# Patient Record
Sex: FEMALE | Race: WHITE | NOT HISPANIC OR LATINO | Employment: FULL TIME | URBAN - METROPOLITAN AREA
[De-identification: names, ages, dates, MRNs, and addresses within clinical notes are randomized per-mention and may not be internally consistent; named-entity substitution may affect disease eponyms.]

---
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
## 2023-01-16 DIAGNOSIS — Z01.10 ENCOUNTER FOR HEARING TEST: Primary | ICD-10-CM

## 2023-02-13 ENCOUNTER — PRE VISIT (OUTPATIENT)
Dept: OTOLARYNGOLOGY | Facility: CLINIC | Age: 18
End: 2023-02-13

## 2023-02-16 NOTE — TELEPHONE ENCOUNTER
FUTURE VISIT INFORMATION      FUTURE VISIT INFORMATION:    Date: 4/18/23    Time: 1:50pm    Location: [facility]  REFERRAL INFORMATION:    Referring provider:  Rina Pa MD    Referring providers clinic:  [facility]    Reason for visit/diagnosis  New per pt/ref for Ear pain, bilateral [H92.03], ref prov: Rina Pa MD in [facility], recs/ref in epic, confirmed [facility] location     RECORDS REQUESTED FROM:         Clinic name Comments Records Status Imaging Status   [facility]  12/5/22- note with Rina Pa MD  11/27/22- note with Roshni Lama PA-C    Epic      [facility]  3/7/22- note with Jeanne Sanchez PA- C  11/11/21- note from Tierra Doran MD  11/1/21- note with Luis Enrique Lawson MD [facility]

## 2023-03-22 ENCOUNTER — TELEPHONE (OUTPATIENT)
Dept: OTOLARYNGOLOGY | Facility: CLINIC | Age: 18
End: 2023-03-22
Payer: COMMERCIAL

## 2023-04-10 ENCOUNTER — APPOINTMENT (OUTPATIENT)
Dept: GENERAL RADIOLOGY | Facility: CLINIC | Age: 18
End: 2023-04-10
Attending: EMERGENCY MEDICINE
Payer: COMMERCIAL

## 2023-04-10 ENCOUNTER — HOSPITAL ENCOUNTER (EMERGENCY)
Facility: CLINIC | Age: 18
Discharge: HOME | End: 2023-04-10
Attending: EMERGENCY MEDICINE | Admitting: EMERGENCY MEDICINE
Payer: COMMERCIAL

## 2023-04-10 VITALS — HEART RATE: 78 BPM | SYSTOLIC BLOOD PRESSURE: 102 MMHG | OXYGEN SATURATION: 100 % | DIASTOLIC BLOOD PRESSURE: 65 MMHG

## 2023-04-10 DIAGNOSIS — H72.92 PERFORATION OF TYMPANIC MEMBRANE, LEFT: ICD-10-CM

## 2023-04-10 DIAGNOSIS — S73.101A SPRAIN OF RIGHT HIP, INITIAL ENCOUNTER: ICD-10-CM

## 2023-04-10 PROCEDURE — 99283 EMERGENCY DEPT VISIT LOW MDM: CPT

## 2023-04-10 PROCEDURE — 73502 X-RAY EXAM HIP UNI 2-3 VIEWS: CPT

## 2023-04-10 RX ORDER — OFLOXACIN 3 MG/ML
5 SOLUTION AURICULAR (OTIC) 2 TIMES DAILY
Qty: 4 ML | Refills: 0 | Status: SHIPPED | OUTPATIENT
Start: 2023-04-10 | End: 2023-04-17

## 2023-04-10 ASSESSMENT — ENCOUNTER SYMPTOMS
SHORTNESS OF BREATH: 0
FEVER: 0
NAUSEA: 0
CHILLS: 0
ABDOMINAL PAIN: 0
SORE THROAT: 0

## 2023-04-10 ASSESSMENT — ACTIVITIES OF DAILY LIVING (ADL): ADLS_ACUITY_SCORE: 35

## 2023-04-10 NOTE — ED PROVIDER NOTES
"  History     Chief Complaint:  Hip Pain       HPI   Kenisha Morrow is a 17 year old female who presents for evaluation of right hip pain.  The patient says 1 month ago she was doing some weight training and felt a pain in the posterior right hip.  This had improved but today as she was getting out of bed she felt a shifting sensation in the same spot.  At times the pain is felt in the thigh but never below the knee.  No numbness or weakness of the legs.  No bladder or bowel incontinence.  No saddle anesthesia.  No other joint pain.    The patient says she recently moved here from [place].  While there she was part way through work-up for Rossana-Danlos syndrome given that she has had chronic joint pains and multiple sprains in the past.  She was not able to complete that evaluation now.    The patient says that she is also chronic ear infections.  She was cleaning her ear with a Q-tip for 5 days ago.  She intermittently inserted the Q-tip too far and began to have bleeding from the left ear.  This has since resolved.  Her hearing is near normal but still sounds somewhat \"muffled\".  She denies any pain in the ear.  No fever or purulent discharge.    Review of External Notes: Outpatient note reviewed from November 27, 2022 and the patient was seen for bilateral nonsuppurative otitis media    ROS:  Review of Systems   Constitutional: Negative for chills and fever.   HENT: Positive for ear pain and hearing loss. Negative for ear discharge and sore throat.    Respiratory: Negative for shortness of breath.    Cardiovascular: Negative for chest pain.   Gastrointestinal: Negative for abdominal pain and nausea.   Musculoskeletal:        Right posterior hip pain   All other systems reviewed and are negative.      Allergies:  Morphine Sulfate     Medications:    ofloxacin (FLOXIN) 0.3 % otic solution  albuterol (PROAIR HFA/PROVENTIL HFA/VENTOLIN HFA) 108 (90 Base) MCG/ACT inhaler  ibuprofen (ADVIL/MOTRIN) 200 MG " tablet        Past Medical History:    Past Medical History:   Diagnosis Date     Cafe-au-lait spots      Chronic rhinitis      Eczema      Esophageal reflux 3/17/2006     Nocturnal enuresis 12/13/2011     Skull fracture (H) 8/12/2013     Trauma 8/12/2013       Past Surgical History:    Past Surgical History:   Procedure Laterality Date     PE TUBES       TONSILLECTOMY & ADENOIDECTOMY          Family History:    family history includes Deep Vein Thrombosis (DVT) in her father; Diabetes in an other family member; Fibromyalgia in her mother; Heart Disease in her father and paternal grandfather; Migraines in her mother; No Known Problems in her brother, brother, brother, brother, sister, sister, and sister.    Social History:   reports that she has never smoked. She has never used smokeless tobacco. She reports that she does not drink alcohol and does not use drugs.  PCP: Rina Pa     Physical Exam     Patient Vitals for the past 24 hrs:   BP Pulse SpO2   04/10/23 1007 -- -- 100 %   04/10/23 1006 102/65 78 98 %        Physical Exam  Constitutional:       General: She is not in acute distress.     Appearance: She is not diaphoretic.   HENT:      Head: Atraumatic.      Mouth/Throat:      Pharynx: No oropharyngeal exudate.   Eyes:      General: No scleral icterus.     Pupils: Pupils are equal, round, and reactive to light.   Cardiovascular:      Heart sounds: Normal heart sounds.   Pulmonary:      Effort: No respiratory distress.      Breath sounds: Normal breath sounds.   Abdominal:      General: Bowel sounds are normal.      Palpations: Abdomen is soft.      Tenderness: There is no abdominal tenderness.   Musculoskeletal:         General: No tenderness.   Skin:     General: Skin is warm.      Findings: No rash.           Emergency Department Course     Imaging:  XR Pelvis and Hip Right 1 View   Final Result   IMPRESSION: Both hips negative for fracture. Pelvis negative for   fracture.      DAISHA SWANSON  MD BETHANIE            SYSTEM ID:  [id number]         Report per radiology    Emergency Department Course & Assessments:         Independent Interpretation (X-rays, CTs, rhythm strip):  Pelvic x-ray independently reviewed.  No fracture.    Disposition:  The patient was discharged to home.     Impression & Plan      Medical Decision Making:  This patient is a 17-year-old who presents with right hip pain.  She had an injury a month ago and then felt that she may have reinjured the hip today.  She has pain over the sacroiliac joint with mild tenderness in this area.  There is full range of motion of the hip without erythema or effusion.  No numbness or weakness of the legs.  She is able to ambulate without difficulty.  She will be referred to orthopedics    Patient also appears to have a perforated left tympanic membrane that is beginning to heal.  It does not clearly appear infected.  She will be referred to ENT and prescribed ofloxacin.    The patient says she plans to live in this area going forward and requested her primary care physician and a referral was placed.        Diagnosis:    ICD-10-CM    1. Sprain of right hip, initial encounter  S73.101A Primary Care Referral      2. Perforation of tympanic membrane, left  H72.92 Primary Care Referral               4/10/2023   Nino Al MD McRoberts, Sean Edward, MD  04/10/23 1010

## 2023-04-10 NOTE — ED TRIAGE NOTES
Patient presents to the ED reporting 1 month of right hip pain. States that initially injured it while lifting weights. Additionally reports was using a q-tip on the left ear a few days ago and began having bleeding from the ear.

## 2023-04-18 ENCOUNTER — PRE VISIT (OUTPATIENT)
Dept: OTOLARYNGOLOGY | Facility: CLINIC | Age: 18
End: 2023-04-18

## 2023-07-10 ENCOUNTER — HOSPITAL ENCOUNTER (EMERGENCY)
Facility: CLINIC | Age: 18
Discharge: LEFT BEFORE DISCHARGE | End: 2023-07-10

## 2023-07-10 VITALS
TEMPERATURE: 98.1 F | RESPIRATION RATE: 18 BRPM | SYSTOLIC BLOOD PRESSURE: 116 MMHG | OXYGEN SATURATION: 98 % | DIASTOLIC BLOOD PRESSURE: 77 MMHG | HEART RATE: 110 BPM

## 2023-07-11 NOTE — ED TRIAGE NOTES
Patient states recently she has not felt well and been nauseated and fatigued. Patient states today she was having abdominal cramping and went to the bathroom and found that she was bleeding. Patient states this is not when her normal period would be.      Triage Assessment     Row Name 07/10/23 2010       Triage Assessment (Adult)    Airway WDL WDL       Respiratory WDL    Respiratory WDL WDL       Skin Circulation/Temperature WDL    Skin Circulation/Temperature WDL WDL       Cardiac WDL    Cardiac WDL WDL       Peripheral/Neurovascular WDL    Peripheral Neurovascular WDL WDL       Cognitive/Neuro/Behavioral WDL    Cognitive/Neuro/Behavioral WDL WDL

## 2023-10-16 ENCOUNTER — APPOINTMENT (OUTPATIENT)
Dept: CT IMAGING | Facility: CLINIC | Age: 18
End: 2023-10-16
Attending: EMERGENCY MEDICINE
Payer: OTHER MISCELLANEOUS

## 2023-10-16 ENCOUNTER — NURSE TRIAGE (OUTPATIENT)
Dept: NURSING | Facility: CLINIC | Age: 18
End: 2023-10-16

## 2023-10-16 ENCOUNTER — TELEPHONE (OUTPATIENT)
Dept: OPHTHALMOLOGY | Facility: CLINIC | Age: 18
End: 2023-10-16

## 2023-10-16 ENCOUNTER — HOSPITAL ENCOUNTER (EMERGENCY)
Facility: CLINIC | Age: 18
Discharge: HOME | End: 2023-10-16
Attending: EMERGENCY MEDICINE | Admitting: EMERGENCY MEDICINE
Payer: OTHER MISCELLANEOUS

## 2023-10-16 ENCOUNTER — OFFICE VISIT (OUTPATIENT)
Dept: URGENT CARE | Facility: URGENT CARE | Age: 18
End: 2023-10-16
Payer: OTHER MISCELLANEOUS

## 2023-10-16 VITALS
TEMPERATURE: 98.5 F | WEIGHT: 140 LBS | RESPIRATION RATE: 16 BRPM | HEART RATE: 65 BPM | BODY MASS INDEX: 22.5 KG/M2 | DIASTOLIC BLOOD PRESSURE: 73 MMHG | SYSTOLIC BLOOD PRESSURE: 119 MMHG | HEIGHT: 66 IN | OXYGEN SATURATION: 100 %

## 2023-10-16 VITALS
TEMPERATURE: 98.4 F | BODY MASS INDEX: 23.85 KG/M2 | HEART RATE: 84 BPM | SYSTOLIC BLOOD PRESSURE: 97 MMHG | WEIGHT: 150 LBS | OXYGEN SATURATION: 98 % | DIASTOLIC BLOOD PRESSURE: 63 MMHG

## 2023-10-16 DIAGNOSIS — Z87.820 HISTORY OF TRAUMATIC BRAIN INJURY: ICD-10-CM

## 2023-10-16 DIAGNOSIS — S05.91XA RIGHT EYE INJURY, INITIAL ENCOUNTER: Primary | ICD-10-CM

## 2023-10-16 DIAGNOSIS — S05.11XA ORBITAL CONTUSION, RIGHT, INITIAL ENCOUNTER: ICD-10-CM

## 2023-10-16 DIAGNOSIS — S06.0X0A CONCUSSION WITHOUT LOSS OF CONSCIOUSNESS, INITIAL ENCOUNTER: ICD-10-CM

## 2023-10-16 LAB — HCG SER QL IA.RAPID: NEGATIVE

## 2023-10-16 PROCEDURE — 84703 CHORIONIC GONADOTROPIN ASSAY: CPT

## 2023-10-16 PROCEDURE — 70481 CT ORBIT/EAR/FOSSA W/DYE: CPT

## 2023-10-16 PROCEDURE — 99214 OFFICE O/P EST MOD 30 MIN: CPT | Performed by: NURSE PRACTITIONER

## 2023-10-16 PROCEDURE — 250N000011 HC RX IP 250 OP 636: Performed by: EMERGENCY MEDICINE

## 2023-10-16 PROCEDURE — 96374 THER/PROPH/DIAG INJ IV PUSH: CPT | Mod: 59

## 2023-10-16 PROCEDURE — 99285 EMERGENCY DEPT VISIT HI MDM: CPT | Mod: 25

## 2023-10-16 RX ORDER — IOPAMIDOL 755 MG/ML
500 INJECTION, SOLUTION INTRAVASCULAR ONCE
Status: COMPLETED | OUTPATIENT
Start: 2023-10-16 | End: 2023-10-16

## 2023-10-16 RX ORDER — KETOROLAC TROMETHAMINE 15 MG/ML
15 INJECTION, SOLUTION INTRAMUSCULAR; INTRAVENOUS ONCE
Status: COMPLETED | OUTPATIENT
Start: 2023-10-16 | End: 2023-10-16

## 2023-10-16 RX ADMIN — IOPAMIDOL 67 ML: 755 INJECTION, SOLUTION INTRAVENOUS at 19:47

## 2023-10-16 RX ADMIN — KETOROLAC TROMETHAMINE 15 MG: 15 INJECTION INTRAMUSCULAR; INTRAVENOUS at 19:19

## 2023-10-16 ASSESSMENT — ACTIVITIES OF DAILY LIVING (ADL)
ADLS_ACUITY_SCORE: 35
ADLS_ACUITY_SCORE: 35

## 2023-10-16 NOTE — ED TRIAGE NOTES
Pt arrives ambulatory from urgent care for further workup for concussion. Pt hit head with pliers while at work on Friday. Went to urgent care today for feeling more tired and headache pain and right eye pain. Eye stain negative and told she had a concussion. Was discharged then called by eye doctor and told to come to ED for MRI and eye doctor.

## 2023-10-16 NOTE — TELEPHONE ENCOUNTER
"Nurse Triage SBAR    Is this a 2nd Level Triage? yes    Situation: Urgent referral in the system.  Eye injury right eye    Background:     blunt trauma with metal rubber pliers 3 days ago to right eyebrow, ha, pain pressure with eye movements, wood's lamp negative for corneal abrasion, urgent care limited without slit lamp/pressure testing  Tesha Crisostomo, NP [facility] [place]     Assessment:   Patient pain with movement to right eye  Blood shot looking eye   Pain 6/10 if movement, comes and goes,  relaxing pain is a 4/10 to right eye.  Mild concussion per pt report at [facility].  Happened Friday at work  + floaters  It would be blurry to see the clock on the wall and book.  Using her phone the light bothers so she is trying to stay off it.  Plus movement makes her eye hurt more.  She was wearing contacts at the time of injury.  She is also a glasses wearere  Patient willing to to to [facility] for visit.    Protocol Recommended Disposition:   Routing to EYE clinic for call back with an appt.    [facility]    Recommendation:   If pain increases or vision gets worse, go to ED      Routed to provider          Reason for Disposition   Sharp object hit the eye (such as metallic chip)    Additional Information   Negative: Major bleeding that can't be stopped   Negative: Sounds like a life-threatening emergency to the triager    Answer Assessment - Initial Assessment Questions  1. MECHANISM: \"How did the injury happen?\"       Saudtter pin let loose and metal pliers to right eye  2. WHEN: \"When did the injury happen?\" (Minutes or hours ago)       Work injury, happened on friday  3. LOCATION: \"What part of the eye is injured?\" (cornea, sclera, eyelid, or periorbital tissue)      blunt trauma with metal rubber pliers 3 days ago to right eyebrow, ha, pain pressure with eye movements, wood's lamp negative for corneal abrasion, urgent care limited without slit lamp/pressure testing    4. EYE " "APPEARANCE: \"What does the eye look like?\"       White of eye is blood shot.  Swelling around face, eyelid puffy  5. VISION: \"Is the vision blurred?\"       Slightly blurry to see a clock, and book as well.  Trying to stay off her cell phone.  Light sensitive.  Contacts and glasses  6. SIZE: For cuts, bruises, or lumps, ask: \"How large is it?\" (Inches or centimeters)         7. PAIN: \"Is it painful?\" If so, ask: \"How bad is the pain?\"       Pain 7/10 with driving due to eye movements,  and 4/10 just relaxing.Hurts behind eye, like a migraines.   Pt has frequent migraines  8. TETANUS: For any breaks in the skin, ask: \"When was the last tetanus booster?\"      About 2 years ago.  With PCP,  different network.  [facility].    Protocols used: Eye Injury-P-[place]    "

## 2023-10-16 NOTE — TELEPHONE ENCOUNTER
You  Marylou Raines, JONATHAN; [facility]; [facility] Eye ServicesJust now (4:44 PM)     CF  Dr. Garcia and Dr. Padilla reviewed and advised pt should go to the ED now for likely needed imaging.  I called and spoke with this patient and she was agreeable.  She may go to the [facility] ED in [place], or she may come to the [facility] ED, she was unsure.    FEDERICO Alfaro  Eye Clinic Triage      You  Marylou Raines RN; [facility]; Soraida Padilla MD; Gabe Garcia MD; [facility] [id number] minutes ago (4:32 PM)     CF  Dr. Garcia - do you think this pt needs to be seen tonight?    Marylou Reza, JONATHAN  [facility]; [facility] [id number] minutes ago (4:11 PM)     DC  Wk injury,  already seen [facility],   , urgent referral in.  Metal plier to right eye.  Please call pt with an appt.  She is willing to go to [facility] or [facility]  Please respond ALL until pt notified of date and time of appt.   Thanks     Marylou Raines RN33 minutes ago (4:11 PM)     DC  Nurse Triage SBAR     Is this a 2nd Level Triage? yes     Situation: Urgent referral in the system.  Eye injury right eye     Background:      blunt trauma with metal rubber pliers 3 days ago to right eyebrow, ha, pain pressure with eye movements, wood's lamp negative for corneal abrasion, urgent care limited without slit lamp/pressure testing  Tesha Crisostomo M, NP [facility] [place]      Assessment:   Patient pain with movement to right eye  Blood shot looking eye   Pain 6/10 if movement, comes and goes,  relaxing pain is a 4/10 to right eye.  Mild concussion per pt report at well.  Happened Friday at work  + floaters  It would be blurry to see the clock on the wall and book.  Using her phone the light bothers so she is trying to stay off it.  Plus movement makes her eye hurt more.  She was wearing contacts at the time of injury.  She is also a glasses wearere  Patient  "willing to to to [facility] for visit.     Protocol Recommended Disposition:   Routing to EYE clinic for call back with an appt.    [facility]     Recommendation:   If pain increases or vision gets worse, go to ED        Routed to provider              Reason for Disposition   Sharp object hit the eye (such as metallic chip)    Additional Information   Negative: Major bleeding that can't be stopped   Negative: Sounds like a life-threatening emergency to the triager    Answer Assessment - Initial Assessment Questions  1. MECHANISM: \"How did the injury happen?\"       Kotter pin let loose and metal pliers to right eye  2. WHEN: \"When did the injury happen?\" (Minutes or hours ago)       Work injury, happened on friday  3. LOCATION: \"What part of the eye is injured?\" (cornea, sclera, eyelid, or periorbital tissue)      blunt trauma with metal rubber pliers 3 days ago to right eyebrow, ha, pain pressure with eye movements, wood's lamp negative for corneal abrasion, urgent care limited without slit lamp/pressure testing    4. EYE APPEARANCE: \"What does the eye look like?\"       White of eye is blood shot.  Swelling around face, eyelid puffy  5. VISION: \"Is the vision blurred?\"       Slightly blurry to see a clock, and book as well.  Trying to stay off her cell phone.  Light sensitive.  Contacts and glasses  6. SIZE: For cuts, bruises, or lumps, ask: \"How large is it?\" (Inches or centimeters)         7. PAIN: \"Is it painful?\" If so, ask: \"How bad is the pain?\"       Pain 7/10 with driving due to eye movements,  and 4/10 just relaxing.Hurts behind eye, like a migraines.   Pt has frequent migraines  8. TETANUS: For any breaks in the skin, ask: \"When was the last tetanus booster?\"      About 2 years ago.  With PCP,  different network.  [facility].    Protocols used: Eye Injury-P-OH           Note     Kenisha Acevedo [phone]  Marylou Raines, RN47 minutes ago (3:56 PM)     "

## 2023-10-16 NOTE — PROGRESS NOTES
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 injury.  She sustained blunt trauma with a metal wrapper pliers hitting her right eyebrow region 3 days ago while working on a car.  She immediately had headache pain around the eye pressure pain with eye movements photophobia.  There was a brief second of black, but she did not lose consciousness and declines vomiting sustained vision change ciliary flush ripping tearing sensation black peripheral vision flashing lights floaters bruising bleeding confusion trouble walking talking.  She does have a relevant history of TBI with skull fracture needing hospitalization and subsequent concussions following that.  Did not feel she needed to go to the ER for this and declines advanced imaging such as CT scan through [facility] today.  She wears contact lenses but does not regularly see her eye doctor.    Past Medical History:   Diagnosis Date    Cafe-au-lait spots     Chronic rhinitis     Eczema     Esophageal reflux 3/17/2006    Nocturnal enuresis 12/13/2011    Skull fracture (H) 8/12/2013    Trauma 8/12/2013     No current facility-administered medications on file prior to visit.  albuterol (PROAIR HFA/PROVENTIL HFA/VENTOLIN HFA) 108 (90 Base) MCG/ACT inhaler, Inhale 1-2 puffs into the lungs every 4 hours as needed for shortness of breath / dyspnea or wheezing  ibuprofen (ADVIL/MOTRIN) 200 MG tablet, Take 200 mg by mouth every 4 hours as needed for mild pain      Social History     Tobacco Use    Smoking status: Never    Smokeless tobacco: Never   Substance Use Topics    Alcohol use: No     Allergies   Allergen Reactions    Morphine Sulfate Nausea     Anything related      Review of Systems  All systems negative except for those listed above in HPI.    OBJECTIVE:   BP 97/63   Pulse 84   Temp 98.4  F (36.9  C) (Oral)   Wt 68 kg (150 lb)   LMP 09/25/2023 (Exact Date)   SpO2 98%   BMI 23.85 kg/m      Physical Exam  Constitutional:       General: She is not in acute distress.     Appearance: She is well-developed. She is not diaphoretic.   HENT:      Head: Normocephalic and atraumatic.      Right Ear: External ear normal.      Left Ear: External ear normal.      Nose: Nose normal.   Eyes:      General:         Right eye: No discharge.         Left eye: No discharge.      Extraocular Movements: Extraocular movements intact.      Conjunctiva/sclera: Conjunctivae normal.      Pupils: Pupils are equal, round, and reactive to light.      Comments: Right upper eyebrow tenderness, no crepitus bony step-off bruising.   Musculoskeletal:         General: No tenderness. Normal range of motion.      Cervical back: Normal range of motion and neck supple. No rigidity or tenderness.   Lymphadenopathy:      Cervical: No cervical adenopathy.   Skin:     General: Skin is warm and dry.      Findings: No bruising, erythema or rash.   Neurological:      General: No focal deficit present.      Mental Status: She is alert and oriented to person, place, and time.      Cranial Nerves: No cranial nerve deficit.      Sensory: No sensory deficit.      Motor: No weakness.      Gait: Gait normal.   Psychiatric:         Mood and Affect: Mood normal.         Behavior: Behavior normal.       ASSESSMENT:    ICD-10-CM    1. Right eye injury, initial encounter  S05.91XA Adult Neurology  Referral     Adult Eye  Referral      2. Concussion without loss of consciousness, initial encounter  S06.0X0A       3. History of traumatic brain injury  Z87.820         PLAN:     Procedure: 1 drop of tetracaine instilled for good anesthesia of the right eye.  Fluorescein stain strip used for uptake with Woods  lamp.  No corneal abrasion lesion foreign body visualized on exam.  Patient tolerated well.    Concussion symptoms on exam without neuro red flags per German head ct score  Eye exam is unremarkable, would still want follow-up if lingering pressure pain  Urgent care without slit lamp pressure testing  Concussion  given history  Urgent care without CT scan in clinic - pt would like to hold on outpatient imaging at this time through [facility]  Cool dark room, low stimuli, less electronics  Tylenol, ice, rest  ER immediately if worst headache of life, vision loss, confusion, vomiting, trouble walking, talking, bruising under eyes or nape of neck    Follow up with primary care provider with any problems, questions or concerns or if symptoms worsen or fail to improve. Patient agreed to plan and verbalized understanding.    Tesha Levine, [facility]

## 2023-10-16 NOTE — PATIENT INSTRUCTIONS
Concussion symptoms on exam without neuro red flags per [place] head ct score  Eye exam is unremarkable, would still want follow-up if lingering pressure pain  Urgent care without slit lamp pressure testing  Concussion  given history  Urgent care without CT scan in clinic - pt would like to hold on outpatient imaging at this time through [facility]  Cool dark room, low stimuli, less electronics  Tylenol, ice, rest  ER immediately if worst headache of life, vision loss, confusion, vomiting, trouble walking, talking, bruising under eyes or nape of neck

## 2023-10-17 NOTE — ED PROVIDER NOTES
"  History     Chief Complaint:  Head Injury       The history is provided by the patient.      Kenisha Morrow is a 18 year old female with history of skull fracture who presents to the ED with head injury and eye injury. Patient reports on Friday she was hit herself in the head with a pair of pliers. She reports she hither right eye. Right eye pain that exacerbates when moving it. Patient was disoriented but did not lose consciousness. Patient went to urgent care and believed it was a concussion. She was also referred to an ophthalmologist for corneal abrasions. Ophthalmologist performed a questionnaire and referred patient to ED. Additional symptoms include fatigue, nausea, and headache.  .    Independent Historian:   None - Patient Only    Review of External Notes:         Medications:    Albuterol inhaler   Ibuprofen   Imitrex   Zofran    Past Medical History:    Chronic rhinitis   Eczema  Gastroesophageal reflux disease  Nocturnal enuresis  Skull fracture  Trauma   Overweight  Migraine     Past Surgical History:    PE tubes  Tonsillectomy and adenoidectomy   Tympanostomy       Physical Exam   Patient Vitals for the past 24 hrs:   BP Temp Temp src Pulse Resp SpO2 Height Weight   10/16/23 1733 111/75 98.5  F (36.9  C) Oral 103 18 100 % 1.676 m (5' 6\") 63.5 kg (140 lb)        Physical Exam  Vitals reviewed.   HENT:      Head: Normocephalic.      Right Ear: Tympanic membrane normal.      Left Ear: Tympanic membrane normal.      Nose: Nose normal.   Eyes:      Comments: There is slight swelling around the superior aspect of the right orbit.  There is tenderness with palpation patient states it hurts when she looks up and to the right.  There is no proptosis.   Cardiovascular:      Rate and Rhythm: Normal rate.   Pulmonary:      Effort: Pulmonary effort is normal.   Musculoskeletal:         General: Normal range of motion.   Skin:     General: Skin is warm.      Capillary Refill: Capillary refill takes less " than 2 seconds.   Neurological:      General: No focal deficit present.      Mental Status: She is alert and oriented to person, place, and time.   Psychiatric:         Mood and Affect: Mood normal.           Emergency Department Course   Imaging:  CT Orbits w Contrast   Final Result   IMPRESSION:    1.  Normal orbit CT.         Laboratory:  Labs Ordered and Resulted from Time of ED Arrival to Time of ED Departure   ISTAT HCG QUALITATIVE PREGNANCY POCT - Normal       Result Value    HCG Qualitative POCT Negative        Emergency Department Course & Assessments:    Interventions:  Medications   ketorolac (TORADOL) injection 15 mg (15 mg Intravenous $Given 10/16/23 1919)   iopamidol (ISOVUE-370) solution 500 mL (67 mLs Intravenous $Given 10/16/23 1947)   sodium chloride (PF) 0.9% PF flush 100 mL (60 mLs Intravenous $Given 10/16/23 1947)        Assessments:  1833 I obtained the history and examined the patient as noted above  2154 I rechecked and updated the patient.    Independent Interpretation (X-rays, CTs, rhythm strip):      Consultations/Discussion of Management or Tests:  None        Social Determinants of Health affecting care:   None    Disposition:  The patient was discharged to home.     Impression & Plan    Medical Decision Making:    Patient presents with blunt right orbital injury.  Apparently called an ophthalmologist in over the phone was recommended MRI for what I am clinically not sure her examination is not really concerning for significant injury to the eyeball there is no conjunctival injection there is no scleral hematoma.  Her pupil is round and reactive and there is no visual concerns she continues to have pain when she moves her right eye therefore CT of the orbit with contrast was performed to rule out retrobulbar hematoma and was negative.  There is no emergency that identified related to ocular injury.  Highly encourage follow-up for retinal exam through ophthalmology no need for admission  and no need for MRI.  Patient was discharged home in stable condition.    Diagnosis:    ICD-10-CM    1. Orbital contusion, right, initial encounter  S05.11XA            Discharge Medications:  Discharge Medication List as of 10/16/2023  9:57 PM        Scribe Disclosure:  I, Mikael Munoz, am serving as a scribe at 9:55 PM on 10/16/2023 to document services personally performed by Frank Feng MD based on my observations and the provider's statements to me.     10/16/2023   Frank Feng MD Goodman, Brian Samuel, MD  10/21/23 4968

## 2023-10-17 NOTE — DISCHARGE INSTRUCTIONS
We have done extensive imaging of the right orbit and find no signs of fracture of the bones nor bleeding behind the eyeball.  We are unsure what your ophthalmologist wanted us to do as far as MRI but these are the standard test for trauma and are negative.  Please follow-up with them if your eye continues to bother you okay to use ice to the right forehead bruising and swelling should go away with time.  Thanks for your patience today.

## 2024-02-03 ENCOUNTER — HEALTH MAINTENANCE LETTER (OUTPATIENT)
Age: 19
End: 2024-02-03

## 2024-06-02 ENCOUNTER — PATIENT OUTREACH (OUTPATIENT)
Dept: CARE COORDINATION | Facility: CLINIC | Age: 19
End: 2024-06-02

## 2025-03-02 ENCOUNTER — HEALTH MAINTENANCE LETTER (OUTPATIENT)
Age: 20
End: 2025-03-02

## 2025-04-03 ENCOUNTER — NURSE TRIAGE (OUTPATIENT)
Dept: FAMILY MEDICINE | Facility: CLINIC | Age: 20
End: 2025-04-03

## 2025-04-03 NOTE — TELEPHONE ENCOUNTER
Nurse Triage SBAR    Is this a 2nd Level Triage? NO    Situation: abnormal bleeding between menstrual cycles    Background: Pt has history of spotting between menstrual cycles. Her periods are typically regular occurring every 28 to 31 days and lasting 5-7 days at at time. Last menstrual cycle started March 13th and ended March 19th. Current bleeding started March 29th and was mostly spotting until today when it became a more moderate flow.    Assessment:  Pt typically has spotting between periods which is brown red and does not soak a pantyliner. She started spotting March 29th and then today it's a bright red medium flow starting 2-3 hours ago but has not yet soaked pad. Some clots are present which are no bigger than a dime. Menstrual like cramp occurring infrequently rated at 4 out of 10 on pain scale. Pt has had unprotected sex and is not on BC pills and is unsure of pregnancy status. She denies lightheadedness, vaginal discharge, fever, and passed tissue.    Protocol Recommended Disposition:   See in Office Within 2 Weeks    Recommendation: Recommended pt take pregnancy test. Reviewed care advice under care tab with pt. Instructed pt to call back if new or worsening symptoms. Patient was given an opportunity to ask questions, verbalized understanding of plan, and is agreeable.    Scheduled pt for tomorrow with YANELI Bowser RN        Does the patient meet one of the following criteria for [facility] visit consideration? 16+ years old, with an [facility] PCP     TIP  Providers, please consider if this condition is appropriate for management at one of our Acute and Diagnostic Services sites.     If patient is a good candidate, please use dotphrase <dot>triageresponse and select Refer to [facility] to document.    Reason for Disposition   Bleeding or spotting between regular periods occurs more than three cycles (3 months) this past year    Additional Information   Negative: SEVERE vaginal bleeding (e.g.,  continuous red blood from vagina, or large blood clots) and very weak (can't stand)   Negative: Passed out (e.g., fainted, lost consciousness, blacked out and was not responding)   Negative: Difficult to awaken or acting confused (e.g., disoriented, slurred speech)   Negative: Shock suspected (e.g., cold/pale/clammy skin, too weak to stand, low BP, rapid pulse)   Negative: Sounds like a life-threatening emergency to the triager   Negative: Pregnant 20 or more weeks (5 months or more)   Negative: Pregnant < 20 weeks (less than 5 months)   Negative: Postpartum (from 0 to 6 weeks after delivery)   Negative: Vaginal discharge is main symptom and bleeding is slight   Negative: SEVERE abdominal pain (e.g., excruciating)   Negative: SEVERE dizziness (e.g., unable to stand, requires support to walk, feels like passing out now)   Negative: SEVERE vaginal bleeding (e.g., soaking 2 pads or tampons per hour and present 2 or more hours; 1 menstrual cup every 2 hours)   Negative: Patient sounds very sick or weak to the triager   Negative: MODERATE vaginal bleeding (i.e., soaking pad or tampon per hour and present > 6 hours; 1 menstrual cup every 6 hours)   Negative: Constant abdominal pain lasting > 2 hours   Negative: Pale skin (pallor) of new-onset or getting worse   Negative: Taking Coumadin (warfarin) or other strong blood thinner, or known bleeding disorder (e.g., thrombocytopenia)   Negative: Skin bruises or nosebleed and not caused by an injury   Negative: Patient wants to be seen   Negative: Passed tissue (e.g., gray-white)   Negative: Bleeding or spotting after procedure (e.g., biopsy) or pelvic examination (e.g., pap smear) that lasts > 7 days   Negative: Periods with > 6 soaked pads or tampons per day   Negative: Periods last > 7 days   Negative: Uses menstrual cups and more than 80 ml blood per menstrual period   Negative: Missed period has occurred 2 or more times in the last year and the cause is not known    "Negative: Menstrual cycle < 21 days OR > 35 days, and occurs more than two cycles (2 months) this past year    Answer Assessment - Initial Assessment Questions  1. BLEEDING SEVERITY: \"Describe the bleeding that you are having.\" \"How much bleeding is there?\"       Sometimes just a little spotting brown red (pantyliner), today it's heavy like medium flow bright red, today flow got heavier 2-3 hours ago and have not soaked through pad yet  2. ONSET: \"When did the bleeding begin?\" \"Is it continuing now?\"      March 29th  3. MENSTRUAL PERIOD: \"When was the last normal menstrual period?\" \"How is this different than your period?\"      March 13th, ended March 19th  4. REGULARITY: \"How regular are your periods?\"      Usually in 28 to 31 days, lasting 5-7 days  5. ABDOMEN PAIN: \"Do you have any pain?\" \"How bad is the pain?\"  (e.g., Scale 0-10; none, mild, moderate, or severe)      Every once in awhile feels like menstrual cramp, rated at 4 out of 10  6. PREGNANCY: \"Is there any chance you are pregnant?\" \"When was your last menstrual period?\"      Maybe, I doubt it, I have had intercourse without protection  7. BREASTFEEDING: \"Are you breastfeeding?\"      no  8. HORMONE MEDICINES: \"Are you taking any hormone medicines, prescription or over-the-counter?\" (e.g., birth control pills, estrogen)      no  9. BLOOD THINNER MEDICINES: \"Do you take any blood thinners?\" (e.g., Coumadin / warfarin, Pradaxa / dabigatran, aspirin)      no  10. CAUSE: \"What do you think is causing the bleeding?\" (e.g., recent gyn surgery, recent gyn procedure; known bleeding disorder, cervical cancer, polycystic ovarian disease, fibroids)          unsure  11. HEMODYNAMIC STATUS: \"Are you weak or feeling lightheaded?\" If Yes, ask: \"Can you stand and walk normally?\"         no  12. OTHER SYMPTOMS: \"What other symptoms are you having with the bleeding?\" (e.g., passed tissue, vaginal discharge, fever, menstrual-type cramps)       Some clots- no bigger than a " dime, Every once in awhile feels like menstrual cramp, rated at 4 out of 10    Denies lightheadedness, vaginal discharge, fever, passed tissue    Protocols used: Vaginal Bleeding - [id number]

## 2025-04-08 ENCOUNTER — OFFICE VISIT (OUTPATIENT)
Dept: URGENT CARE | Facility: URGENT CARE | Age: 20
End: 2025-04-08
Payer: COMMERCIAL

## 2025-04-08 VITALS
BODY MASS INDEX: 27.32 KG/M2 | HEIGHT: 66 IN | DIASTOLIC BLOOD PRESSURE: 69 MMHG | OXYGEN SATURATION: 100 % | RESPIRATION RATE: 16 BRPM | SYSTOLIC BLOOD PRESSURE: 103 MMHG | HEART RATE: 90 BPM | WEIGHT: 170 LBS | TEMPERATURE: 98.3 F

## 2025-04-08 DIAGNOSIS — J02.9 ACUTE SORE THROAT: Primary | ICD-10-CM

## 2025-04-08 LAB — DEPRECATED S PYO AG THROAT QL EIA: NEGATIVE

## 2025-04-08 PROCEDURE — 87651 STREP A DNA AMP PROBE: CPT | Performed by: PHYSICIAN ASSISTANT

## 2025-04-08 PROCEDURE — 3074F SYST BP LT 130 MM HG: CPT | Performed by: PHYSICIAN ASSISTANT

## 2025-04-08 PROCEDURE — 3078F DIAST BP <80 MM HG: CPT | Performed by: PHYSICIAN ASSISTANT

## 2025-04-08 PROCEDURE — 99213 OFFICE O/P EST LOW 20 MIN: CPT | Performed by: PHYSICIAN ASSISTANT

## 2025-04-08 NOTE — PROGRESS NOTES
Order sent over to [facility] Atten: Linette   SUBJECTIVE:  Kenisha Morrow is a 19 year old female comes in with concerns for possible strep throat.  Patient states that she has had a sore throat for approximately 2 days.  Also has had mild fatigue and headache.  There is been no fevers.  Denies any cough or cold symptoms.  She is concerned for possible strep.  She is unsure of any exposures.  She is able to eat and drink well.  She is otherwise at baseline health.    Past Medical History:   Diagnosis Date    Cafe-au-lait spots     Chronic rhinitis     Eczema     Esophageal reflux 3/17/2006    Nocturnal enuresis 12/13/2011    Skull fracture (H) 8/12/2013    Trauma 8/12/2013     Patient Active Problem List   Diagnosis    Allergic rhinitis    Chronic rhinitis    Eczema    Premature adrenarche    PCOS (polycystic ovarian syndrome)    Overweight (BMI 25.0-29.9)    Migraine syndrome     Current Outpatient Medications   Medication Sig Dispense Refill    albuterol (PROAIR HFA/PROVENTIL HFA/VENTOLIN HFA) 108 (90 Base) MCG/ACT inhaler Inhale 1-2 puffs into the lungs every 4 hours as needed for shortness of breath / dyspnea or wheezing 18 g 1    ibuprofen (ADVIL/MOTRIN) 200 MG tablet Take 200 mg by mouth every 4 hours as needed for mild pain       No current facility-administered medications for this visit.     Social History     Socioeconomic History    Marital status: Single     Spouse name: Not on file    Number of children: Not on file    Years of education: Not on file    Highest education level: Not on file   Occupational History    Occupation: STUDENT   Tobacco Use    Smoking status: Never    Smokeless tobacco: Never   Vaping Use    Vaping status: Never Used   Substance and Sexual Activity    Alcohol use: No    Drug use: No    Sexual activity: Yes     Partners: Male     Birth control/protection: Condom   Other Topics Concern    Not on file   Social History Narrative    Not on file     Social Drivers of Health     Financial Resource Strain: High Risk  (1/1/2022)    Received from [facility], [facility]    Financial Resource Strain     Difficulty of Paying Living Expenses: Not on file     Difficulty of Paying Living Expenses: Not on file   Food Insecurity: Not on file   Transportation Needs: Not on file   Physical Activity: Not on file   Stress: Not on file   Social Connections: Unknown (1/1/2022)    [facility], [facility]    Social Connections     Frequency of Communication with Friends and Family: Not on file   Interpersonal Safety: Not on file   Housing Stability: Not on file     ROS  negative  other than stated above    Exam:  GENERAL APPEARANCE: healthy, alert and no distress  EYES: EOMI,  PERRL  HENT: TMs and canals clear bilaterally.  Oral mucosa moist with no significant erythema noted.  There is no exudate you have is midline and no evidence for abscess.  Significant nasal congestion noted  NECK: no adenopathy, no asymmetry, masses, or scars and thyroid normal to palpation  RESP: lungs clear to auscultation - no rales, rhonchi or wheezes  CV: regular rates and rhythm, normal S1 S2, no S3 or S4 and no murmur, click or rub -  SKIN: no suspicious lesions or rashes    Results for orders placed or performed in visit on 04/08/25   Streptococcus A Rapid Screen w/Reflex to PCR - Clinic Collect     Status: Normal    Specimen: Throat; Swab   Result Value Ref Range    Group A Strep antigen Negative Negative     assessment/plan:  (J02.9) Acute sore throat  (primary encounter diagnosis)  Comment:   Plan: Streptococcus A Rapid Screen w/Reflex to PCR -         Clinic Collect, Group A Streptococcus PCR         Throat Swab        Patient with a 2-day history of sore throat along with mild headache.  Strep was negative.  Exam is unremarkable and appears to be viral.  Culture is pending.  Advised over-the-counter meds for  symptomatic relief.  Red flag signs were discussed return to clinic if symptoms worsen or new symptoms develop.

## 2025-04-08 NOTE — PROGRESS NOTES
Urgent Care Clinic Visit    Chief Complaint   Patient presents with    Urgent Care     Sore throat x 2 days , fatigue and headache                4/8/2025     4:55 PM   Additional Questions   Roomed by Gabi ISRAEL

## 2025-04-09 LAB — S PYO DNA THROAT QL NAA+PROBE: NOT DETECTED

## 2025-06-03 ENCOUNTER — OFFICE VISIT (OUTPATIENT)
Dept: OBGYN | Facility: CLINIC | Age: 20
End: 2025-06-03
Attending: PHYSICIAN ASSISTANT
Payer: COMMERCIAL

## 2025-06-03 VITALS
HEIGHT: 66 IN | WEIGHT: 168 LBS | DIASTOLIC BLOOD PRESSURE: 58 MMHG | BODY MASS INDEX: 27 KG/M2 | SYSTOLIC BLOOD PRESSURE: 100 MMHG

## 2025-06-03 DIAGNOSIS — N92.6 ABNORMAL BLEEDING IN MENSTRUAL CYCLE: ICD-10-CM

## 2025-06-03 DIAGNOSIS — E28.2 PCOS (POLYCYSTIC OVARIAN SYNDROME): ICD-10-CM

## 2025-06-03 DIAGNOSIS — L70.0 ACNE VULGARIS: Primary | ICD-10-CM

## 2025-06-03 PROCEDURE — 3078F DIAST BP <80 MM HG: CPT | Performed by: OBSTETRICS & GYNECOLOGY

## 2025-06-03 PROCEDURE — 3074F SYST BP LT 130 MM HG: CPT | Performed by: OBSTETRICS & GYNECOLOGY

## 2025-06-03 PROCEDURE — G2211 COMPLEX E/M VISIT ADD ON: HCPCS | Performed by: OBSTETRICS & GYNECOLOGY

## 2025-06-03 PROCEDURE — 99203 OFFICE O/P NEW LOW 30 MIN: CPT | Performed by: OBSTETRICS & GYNECOLOGY

## 2025-06-03 RX ORDER — DROSPIRENONE AND ETHINYL ESTRADIOL 0.02-3(28)
1 KIT ORAL DAILY
Qty: 84 TABLET | Refills: 4 | Status: SHIPPED | OUTPATIENT
Start: 2025-06-03

## 2025-06-03 NOTE — PROGRESS NOTES
"  SUBJECTIVE:                                                   CC:  Patient presents with:  Consult: PCOS      HPI:  Kenisha Morrow is a 19 year old      History of Present Illness-  Kenisha Morrow  - Diagnosed with PCOS, experiencing confidence issues  - Significant weight fluctuations, ranging from 128 lbs to 170 lbs  - Facial and neck hair growth, hair loss  - Tried 3 or 4 different birth controls without improvement in symptoms  - Ultrasound performed, no cysts detected  - Irregular periods historically, currently experiencing spotting between periods  - Spotting lasted 6 weeks with period in the middle  - Prolactin level mildly elevated, not breastfeeding  - Testosterone tested multiple times, within normal range but at the higher end     Gyn History:  Patient's last menstrual period was 05/15/2025 (exact date).       Using none for contraception.    Last 3 Pap and HPV Results:       2025     2:21 PM   PAP / HPV   PAP Negative for Intraepithelial Lesion or Malignancy (NILM)        PMH, PSH, Soc Hx, Fam Hx, Meds, and allergies reviewed in Epic.    OBJECTIVE:     /58 (BP Location: Right arm, Patient Position: Chair, Cuff Size: Adult Regular)   Ht 1.676 m (5' 6\")   Wt 76.2 kg (168 lb)   LMP 05/15/2025 (Exact Date)   BMI 27.12 kg/m      Gen: Healthy appearing female, no acute distress, comfortable  Skin: cystic acne scattered on face, scant amount on chin and forehead, no sign of hirsutism on today's exam  Psych: mentation appears normal and affect bright    Test Results:    Results  - Ultrasound: No cysts observed  - Prolactin level: Mildly elevated  - Testosterone: Within normal range, higher end     Component      Latest Ref Rng 2025  2:55 PM   HIV Antigen Antibody Combo      Nonreactive  Nonreactive    TSH      0.30 - 4.20 uIU/mL 1.41    Prolactin      5 - 23 ng/mL 36 (H)    hCG Quantitative      <5 mIU/mL <1    Luteinizing Hormone      mIU/mL 8.8    FSH      " mIU/mL 3.1    Treponema Antibodies      Nonreactive  Nonreactive       Legend:  (H) High    ASSESSMENT/PLAN:                                                      1. PCOS (polycystic ovarian syndrome)  - Ob/Gyn  Referral  - drospirenone-ethinyl estradiol (NALINI) 3-0.02 MG tablet; Take 1 tablet by mouth daily. Can skip placebo pills  Dispense: 84 tablet; Refill: 4  - Prolactin; Future    2. Abnormal bleeding in menstrual cycle  - Ob/Gyn  Referral    3. Acne vulgaris (Primary)  - drospirenone-ethinyl estradiol (NALINI) 3-0.02 MG tablet; Take 1 tablet by mouth daily. Can skip placebo pills  Dispense: 84 tablet; Refill: 4  - Adult Dermatology  Referral; Future      Assessment & Plan  Polycystic Ovary Syndrome (PCOS)  - Diagnosed with PCOS, experiencing symptoms of weight fluctuation, facial and neck hair growth, hair loss, and irregular periods with spotting. No current relationship with endocrinology. Previous ultrasound showed no cysts, but met some criteria for PCOS. Slightly elevated prolactin level noted, not breastfeeding.  - Order repeat fasting morning prolactin level test. If prolactin remains elevated, consider brain imaging to rule out other causes. Prescribe birth control pills to manage periods and related symptoms. Recommend dermatology consultation for acne and hair growth concerns. Schedule follow-up in 3-6 months.  - Risks and side effects: Discussed potential blood clotting risk with certain birth control pills, especially for those with a high risk of blood clots. Patient has no personal history of blood clots but mentioned family history of blood clots and varicose veins. Advised that birth control pills can increase sex hormone-binding globulin, which may help manage symptoms by normalizing testosterone levels.    Acne vulgaris  - Acne may be related to PCOS and hormone levels.  - Recommend referral to dermatology for further evaluation and potential treatment options, including  the possibility of Accutane with appropriate contraception.  - Risks and side effects: Discussed that Accutane requires effective contraception due to potential risks.    Weight fluctuation  - Weight fluctuation is a concern related to PCOS.  - Consider birth control pills to help manage weight gain. Discussed that weight gain can be influenced by different types of birth control pills. Recommend preventing pregnancy as it is a significant cause of weight gain.  - Risks and side effects: Discussed that some birth control pills have a slightly higher risk of blood clotting, but patient has no personal history of blood clots and is considered safe to try the prescribed birth control.    Hair growth and loss  - Facial and neck hair growth and hair loss are symptoms of PCOS.  - Recommend consultation with dermatology for further management of hair growth and potential treatments like electrolysis or laser hair removal.     Patsy Bustos MD, MPH  Obstetrics and Gynecology

## 2025-06-03 NOTE — NURSING NOTE
"Chief Complaint   Patient presents with    Consult     PCOS       Initial /58 (BP Location: Right arm, Patient Position: Chair, Cuff Size: Adult Regular)   Ht 1.676 m (5' 6\")   Wt 76.2 kg (168 lb)   LMP 05/15/2025 (Exact Date)   BMI 27.12 kg/m   Estimated body mass index is 27.12 kg/m  as calculated from the following:    Height as of this encounter: 1.676 m (5' 6\").    Weight as of this encounter: 76.2 kg (168 lb).  BP completed using cuff size: regular    Questioned patient about current smoking habits.  Pt. has never smoked.          The following HM Due: NONE    Tayla Brothers CMA               "

## 2025-06-03 NOTE — PATIENT INSTRUCTIONS
Based on our discussion, I have outlined the following instructions for you:      - Get a blood test in the morning to check your prolactin levels. Make sure you haven't eaten before the test.  - If your prolactin levels are still high, we might need to do a brain scan to check for other issues.  - Start taking birth control pills to help with your periods and other symptoms.  - Make an appointment with a skin doctor to talk about your acne and hair growth.  - Plan to see us again in 3 to 6 months for a follow-up.  - See a skin doctor to explore more options for treating your acne, which might include a special medication that requires you to use birth control.  - Consider using birth control pills to help manage any weight changes.  - Talk to a skin doctor about ways to manage hair growth, such as treatments like electrolysis or laser hair removal.    Thank you again for your visit, and we look forward to supporting you in your journey to better health.

## 2025-06-04 ENCOUNTER — PATIENT OUTREACH (OUTPATIENT)
Dept: CARE COORDINATION | Facility: CLINIC | Age: 20
End: 2025-06-04
Payer: COMMERCIAL

## 2025-06-07 ENCOUNTER — LAB (OUTPATIENT)
Dept: LAB | Facility: CLINIC | Age: 20
End: 2025-06-07
Payer: COMMERCIAL

## 2025-06-07 DIAGNOSIS — E28.2 PCOS (POLYCYSTIC OVARIAN SYNDROME): ICD-10-CM

## 2025-06-07 LAB — PROLACTIN SERPL 3RD IS-MCNC: 44 NG/ML (ref 5–23)

## 2025-06-07 PROCEDURE — 36415 COLL VENOUS BLD VENIPUNCTURE: CPT

## 2025-06-07 PROCEDURE — 84146 ASSAY OF PROLACTIN: CPT

## 2025-06-10 ENCOUNTER — RESULTS FOLLOW-UP (OUTPATIENT)
Dept: OBGYN | Facility: CLINIC | Age: 20
End: 2025-06-10

## 2025-06-10 DIAGNOSIS — R79.89 ELEVATED PROLACTIN LEVEL: Primary | ICD-10-CM

## 2025-06-27 NOTE — PROGRESS NOTES
SUBJECTIVE:                                                   Kenisha Morrow is a 20 year old who presents to clinic today for the following health issue(s):  Patient presents with:  Breast Problem: Changes in left breast, thought was due to weight changes  She has had this mass for 1-1 1/2 years. It is has stayed about the same.  She has been diagnosed with PCOS and struggles with weight fluctuations. Nothing seems to work except for severe calorie depravation.  She had elevated prolactin and a normal brain MRI. She did not hear back from the doctor who had ordered it but they had discussed following up with Endocrinology.  She is accompanied by her sibling today.       Patient's last menstrual period was 2025 (exact date).  Menstrual History: has only been on OCP for 1 month  Patient is sexually active  .  Using oral contraceptives for contraception.   Health maintenance updated:  no  STI infx testing offered:  Declined  PHQ-2 Score:         2025    12:46 PM 2022     5:45 PM   PHQ-2 (  [employer])   Q1: Little interest or pleasure in doing things 0 0   Q2: Feeling down, depressed or hopeless 0 0   PHQ-2 Score 0  0   Q1: Little interest or pleasure in doing things Not at all Not at all   Q2: Feeling down, depressed or hopeless Not at all Not at all   PHQ-2 Score 0 0       Patient-reported       Problem list and histories reviewed & adjusted, as indicated.  Additional history: as documented.    Patient Active Problem List   Diagnosis    Allergic rhinitis    Chronic rhinitis    Eczema    Premature adrenarche    PCOS (polycystic ovarian syndrome)    Overweight (BMI 25.0-29.9)    Migraine syndrome     Past Surgical History:   Procedure Laterality Date    PE TUBES      TONSILLECTOMY & ADENOIDECTOMY        Social History     Tobacco Use    Smoking status: Never    Smokeless tobacco: Never   Substance Use Topics    Alcohol use: No      Problem (# of Occurrences) Relation (Name,Age of Onset)  "   Deep Vein Thrombosis (DVT) (1) Father    Diabetes (1) Other: numerous family members    Heart Disease (2) Father, Paternal Grandfather    Fibromyalgia (1) Mother    Migraines (1) Mother    No Known Problems (7) Sister, Sister, Sister, Brother, Brother, Brother, Brother              Current Outpatient Medications   Medication Sig Dispense Refill    albuterol (PROAIR HFA/PROVENTIL HFA/VENTOLIN HFA) 108 (90 Base) MCG/ACT inhaler Inhale 1-2 puffs into the lungs every 4 hours as needed for shortness of breath / dyspnea or wheezing 18 g 1    drospirenone-ethinyl estradiol (NALINI) 3-0.02 MG tablet Take 1 tablet by mouth daily. Can skip placebo pills 84 tablet 4    ibuprofen (ADVIL/MOTRIN) 200 MG tablet Take 200 mg by mouth every 4 hours as needed for mild pain       No current facility-administered medications for this visit.     Allergies   Allergen Reactions    Morphine Sulfate Nausea     Anything related       ROS:  12 point review of systems negative other than symptoms noted below.    OBJECTIVE:     /58   Ht 1.676 m (5' 6\")   Wt 77.6 kg (171 lb)   LMP 06/20/2025 (Exact Date)   BMI 27.60 kg/m    Body mass index is 27.6 kg/m .    PHYSICAL EXAM:  Constitutional:  Appearance: Well nourished, well developed alert, in no acute distress  Breasts:  Inspection of Breasts:  Symmetric bilaterally.  No puckering.  No skin changes.  Palpation of Breasts and Axillae:  Left on left side at 6:00 position noted.  no breast tenderness Axillary Lymph Nodes:  No lymphadenopathy present  Psychiatric:  Mentation appears normal and affect normal/bright.     In-Clinic Test Results:  No results found for this or any previous visit (from the past 24 hours).    ASSESSMENT/PLAN:                                                        ICD-10-CM    1. Weight gain  R63.5 Adult Nutrition  Referral     Adult Endocrinology  Referral      2. Elevated prolactin level  R79.89 Adult Endocrinology  Referral      3. Mass " of lower outer quadrant of left breast  N63.23 US Breast Left Limited 1-3 Quadrants     MA Diagnostic Left w/ Baljinder      Breast imaging ordered and recommended due to palpable breast mass.     She accepted an Endocrinology referral to follow up on elevated prolactin.  She agreed to a Nutrition referral regarding the weight loss.

## 2025-06-29 ENCOUNTER — HOSPITAL ENCOUNTER (OUTPATIENT)
Dept: MRI IMAGING | Facility: CLINIC | Age: 20
Discharge: HOME | End: 2025-06-29
Attending: OBSTETRICS & GYNECOLOGY | Admitting: OBSTETRICS & GYNECOLOGY
Payer: COMMERCIAL

## 2025-06-29 PROCEDURE — 255N000002 HC RX 255 OP 636: Performed by: OBSTETRICS & GYNECOLOGY

## 2025-06-29 PROCEDURE — 70553 MRI BRAIN STEM W/O & W/DYE: CPT

## 2025-06-29 PROCEDURE — A9585 GADOBUTROL INJECTION: HCPCS | Performed by: OBSTETRICS & GYNECOLOGY

## 2025-06-29 RX ORDER — GADOBUTROL 604.72 MG/ML
7.5 INJECTION INTRAVENOUS ONCE
Status: COMPLETED | OUTPATIENT
Start: 2025-06-29 | End: 2025-06-29

## 2025-06-29 RX ADMIN — GADOBUTROL 7.5 ML: 604.72 INJECTION INTRAVENOUS at 09:24

## 2025-07-01 ENCOUNTER — OFFICE VISIT (OUTPATIENT)
Dept: OBGYN | Facility: CLINIC | Age: 20
End: 2025-07-01
Payer: COMMERCIAL

## 2025-07-01 VITALS
WEIGHT: 171 LBS | DIASTOLIC BLOOD PRESSURE: 58 MMHG | HEIGHT: 66 IN | SYSTOLIC BLOOD PRESSURE: 120 MMHG | BODY MASS INDEX: 27.48 KG/M2

## 2025-07-01 DIAGNOSIS — R63.5 WEIGHT GAIN: Primary | ICD-10-CM

## 2025-07-01 DIAGNOSIS — N63.23 MASS OF LOWER OUTER QUADRANT OF LEFT BREAST: ICD-10-CM

## 2025-07-01 DIAGNOSIS — R79.89 ELEVATED PROLACTIN LEVEL: ICD-10-CM

## 2025-07-01 PROCEDURE — 99213 OFFICE O/P EST LOW 20 MIN: CPT | Performed by: ADVANCED PRACTICE MIDWIFE

## 2025-07-01 PROCEDURE — 3078F DIAST BP <80 MM HG: CPT | Performed by: ADVANCED PRACTICE MIDWIFE

## 2025-07-01 PROCEDURE — 3074F SYST BP LT 130 MM HG: CPT | Performed by: ADVANCED PRACTICE MIDWIFE

## 2025-07-01 NOTE — NURSING NOTE
"Chief Complaint   Patient presents with    Breast Problem     Changes in left breast, thought was due to weight changes       Initial /58   Ht 1.676 m (5' 6\")   Wt 77.6 kg (171 lb)   LMP 2025 (Exact Date)   BMI 27.60 kg/m   Estimated body mass index is 27.6 kg/m  as calculated from the following:    Height as of this encounter: 1.676 m (5' 6\").    Weight as of this encounter: 77.6 kg (171 lb).  BP completed using cuff size: regular    Questioned patient about current smoking habits.  Pt. has never smoked.          The following HM Due: NONE      Lucita Burciaga CMA on 2025 at 2:45 PM    "

## 2025-07-02 ENCOUNTER — PATIENT OUTREACH (OUTPATIENT)
Dept: CARE COORDINATION | Facility: CLINIC | Age: 20
End: 2025-07-02
Payer: COMMERCIAL

## 2025-07-14 DIAGNOSIS — R79.89 ELEVATED PROLACTIN LEVEL: Primary | ICD-10-CM

## 2025-07-15 ENCOUNTER — PATIENT OUTREACH (OUTPATIENT)
Dept: CARE COORDINATION | Facility: CLINIC | Age: 20
End: 2025-07-15
Payer: COMMERCIAL

## 2025-07-17 ENCOUNTER — PATIENT OUTREACH (OUTPATIENT)
Dept: CARE COORDINATION | Facility: CLINIC | Age: 20
End: 2025-07-17
Payer: COMMERCIAL

## 2025-07-23 ENCOUNTER — HOSPITAL ENCOUNTER (OUTPATIENT)
Dept: ULTRASOUND IMAGING | Facility: CLINIC | Age: 20
Discharge: HOME | End: 2025-07-23
Attending: ADVANCED PRACTICE MIDWIFE
Payer: COMMERCIAL

## 2025-07-23 DIAGNOSIS — N63.23 MASS OF LOWER OUTER QUADRANT OF LEFT BREAST: ICD-10-CM

## 2025-07-23 PROCEDURE — 76642 ULTRASOUND BREAST LIMITED: CPT | Mod: LT
